# Patient Record
Sex: FEMALE | Race: WHITE | NOT HISPANIC OR LATINO | URBAN - NONMETROPOLITAN AREA
[De-identification: names, ages, dates, MRNs, and addresses within clinical notes are randomized per-mention and may not be internally consistent; named-entity substitution may affect disease eponyms.]

---

## 2021-11-19 ENCOUNTER — IMPORTED ENCOUNTER (OUTPATIENT)
Dept: URBAN - NONMETROPOLITAN AREA CLINIC 1 | Facility: CLINIC | Age: 26
End: 2021-11-19

## 2021-11-19 PROBLEM — H52.13: Noted: 2021-11-19

## 2021-11-19 PROCEDURE — 92340 FIT SPECTACLES MONOFOCAL: CPT

## 2021-11-19 PROCEDURE — S0620 ROUTINE OPHTHALMOLOGICAL EXA: HCPCS

## 2021-11-19 NOTE — PATIENT DISCUSSION
Myopia / Astigmatism OU - Discussed diagnosis in detail with patient- New Glasses RX given today- Continue to monitor- RTC 1 year complete

## 2022-04-09 ASSESSMENT — TONOMETRY
OD_IOP_MMHG: 21
OS_IOP_MMHG: 20

## 2022-04-09 ASSESSMENT — VISUAL ACUITY
OD_SC: 20/20-1
OU_SC: 20/20-1
OS_SC: 20/20-1